# Patient Record
Sex: MALE | Race: WHITE | Employment: FULL TIME | URBAN - METROPOLITAN AREA
[De-identification: names, ages, dates, MRNs, and addresses within clinical notes are randomized per-mention and may not be internally consistent; named-entity substitution may affect disease eponyms.]

---

## 2018-09-15 ENCOUNTER — HOSPITAL ENCOUNTER (EMERGENCY)
Age: 33
Discharge: HOME | End: 2018-09-15
Attending: EMERGENCY MEDICINE
Payer: COMMERCIAL

## 2018-09-15 ENCOUNTER — APPOINTMENT (OUTPATIENT)
Dept: GENERAL RADIOLOGY | Age: 33
End: 2018-09-15
Payer: COMMERCIAL

## 2018-09-15 VITALS
HEART RATE: 106 BPM | SYSTOLIC BLOOD PRESSURE: 157 MMHG | DIASTOLIC BLOOD PRESSURE: 102 MMHG | RESPIRATION RATE: 16 BRPM | OXYGEN SATURATION: 100 % | TEMPERATURE: 98.6 F | HEIGHT: 65 IN

## 2018-09-15 DIAGNOSIS — S83.91XA SPRAIN OF RIGHT KNEE, UNSPECIFIED LIGAMENT, INITIAL ENCOUNTER: Primary | ICD-10-CM

## 2018-09-15 PROCEDURE — 73562 X-RAY EXAM OF KNEE 3: CPT

## 2018-09-15 PROCEDURE — 6370000000 HC RX 637 (ALT 250 FOR IP): Performed by: EMERGENCY MEDICINE

## 2018-09-15 PROCEDURE — 99283 EMERGENCY DEPT VISIT LOW MDM: CPT

## 2018-09-15 RX ORDER — NAPROXEN 500 MG/1
500 TABLET ORAL 2 TIMES DAILY WITH MEALS
Qty: 30 TABLET | Refills: 0 | Status: SHIPPED | OUTPATIENT
Start: 2018-09-15

## 2018-09-15 RX ORDER — HYDROCODONE BITARTRATE AND ACETAMINOPHEN 5; 325 MG/1; MG/1
1 TABLET ORAL ONCE
Status: COMPLETED | OUTPATIENT
Start: 2018-09-15 | End: 2018-09-15

## 2018-09-15 RX ADMIN — HYDROCODONE BITARTRATE AND ACETAMINOPHEN 1 TABLET: 5; 325 TABLET ORAL at 19:21

## 2018-09-15 ASSESSMENT — PAIN DESCRIPTION - PAIN TYPE: TYPE: ACUTE PAIN

## 2018-09-15 ASSESSMENT — PAIN DESCRIPTION - LOCATION: LOCATION: KNEE

## 2018-09-15 ASSESSMENT — PAIN SCALES - GENERAL
PAINLEVEL_OUTOF10: 9
PAINLEVEL_OUTOF10: 3

## 2018-09-15 ASSESSMENT — PAIN DESCRIPTION - DESCRIPTORS: DESCRIPTORS: ACHING

## 2018-09-15 ASSESSMENT — PAIN DESCRIPTION - ORIENTATION: ORIENTATION: RIGHT

## 2018-09-15 NOTE — ED TRIAGE NOTES
Patient c/o R knee pain after jolted walking up a step at 0100 l early this AM, states that he heard and felt a pop. No obvious swelling. NVS intact distally.

## 2018-09-15 NOTE — ED PROVIDER NOTES
[facility] Emergency Department    CHIEF COMPLAINT  Knee Injury      HISTORY OF PRESENT ILLNESS  Vu Romano is a 35 y.o. male  presenting to the ER for evaluation of right knee pain. Patient says pain started around 1 AM.  He accidentally twisted his right knee and has had persistent pain since that time. He describes it as a sharp pain in the right knee which is worse with movement. He has been using his own crutches at home. He denies other injuries or complaints. No other complaints, modifying factors or associated symptoms. I have reviewed the following from the nursing documentation. Past Medical History:   Diagnosis Date    Hypertension      No past surgical history on file. No family history on file. Social History     Social History    Marital status: Single     Spouse name: N/A    Number of children: N/A    Years of education: N/A     Occupational History    Not on file. Social History Main Topics    Smoking status: Current Every Day Smoker     Packs/day: 0.50    Smokeless tobacco: Never Used    Alcohol use Yes      Comment: daily    Drug use: No    Sexual activity: Not on file     Other Topics Concern    Not on file     Social History Narrative    No narrative on file     No current facility-administered medications for this encounter. Current Outpatient Prescriptions   Medication Sig Dispense Refill    LISINOPRIL PO Take by mouth      naproxen (NAPROSYN) 500 MG tablet Take 1 tablet by mouth 2 times daily (with meals) 30 tablet 0     No Known Allergies    REVIEW OF SYSTEMS  10 systems reviewed, pertinent positives per HPI otherwise noted to be negative. PHYSICAL EXAM  BP (!) 157/102 Comment: Pt States He Forgot to Take BP Med Today  Pulse 106   Temp 98.6 °F (37 °C) (Oral)   Resp 16   Ht 5' 5\" (1.651 m)   SpO2 100%   GENERAL APPEARANCE: Awake and alert. Cooperative. No acute distress. HEAD: Normocephalic. Atraumatic. EYES: PERRL. EOM's grossly intact. HEART: RRR. LUNGS: Respirations unlabored. CTAB. Good air exchange. Speaking comfortably in full sentences. EXTREMITIES: No peripheral edema. All extremities neurovascularly intact. Tenderness to palpation anterior aspect of the right knee. No significant laxity or deformity. Strong dorsalis pedis pulse bilaterally. SKIN: Warm and dry. No acute rashes. NEUROLOGICAL: Alert and oriented. CN's 2-12 intact. Strength 5/5, sensation intact. 2 plus DTR's in lower extremity bilaterally. Antalgic gait   PSYCHIATRIC: Normal mood and affect. RADIOLOGY    Xr Knee Right (3 Views)    Result Date: 9/15/2018  RIGHT KNEE HISTORY: Pain FINDINGS: There is no joint effusion. The joint spaces are intact. There is no bony destructive process or fracture. Normal right knee. ED COURSE/MDM  Patient seen and evaluated. Imaging reviewed and results discussed with patient. This is a 51-year-old male presenting with right knee pain. No radiographic evidence of acute traumatic abnormality identified on x-ray. I did discuss with the patient's possibility of internal derangement not identified on x-ray and that he would likely benefit from outpatient MRI. He was referred to orthopedics for ongoing management. Patient brought his own crutches and they were fitted to his size. Right knee immobilizer was applied and patient was discharged. CLINICAL IMPRESSION  1. Sprain of right knee, unspecified ligament, initial encounter        Blood pressure (!) 157/102, pulse 106, temperature 98.6 °F (37 °C), temperature source Oral, resp. rate 16, height 5' 5\" (1.651 m), SpO2 100 %. DISPOSITION  Catherine Manzano was discharged to home in stable condition.            Madonna Douglas DO  09/15/18 2051

## 2021-09-12 ENCOUNTER — HOSPITAL ENCOUNTER (EMERGENCY)
Age: 36
Discharge: HOME | End: 2021-09-12
Attending: EMERGENCY MEDICINE
Payer: COMMERCIAL

## 2021-09-12 VITALS
WEIGHT: 170.4 LBS | DIASTOLIC BLOOD PRESSURE: 96 MMHG | HEIGHT: 65 IN | TEMPERATURE: 99.1 F | HEART RATE: 99 BPM | RESPIRATION RATE: 14 BRPM | SYSTOLIC BLOOD PRESSURE: 144 MMHG | BODY MASS INDEX: 28.39 KG/M2 | OXYGEN SATURATION: 100 %

## 2021-09-12 DIAGNOSIS — S81.812A LACERATION OF LEFT LOWER EXTREMITY, INITIAL ENCOUNTER: Primary | ICD-10-CM

## 2021-09-12 PROCEDURE — 99283 EMERGENCY DEPT VISIT LOW MDM: CPT

## 2021-09-12 PROCEDURE — 12002 RPR S/N/AX/GEN/TRNK2.6-7.5CM: CPT

## 2021-09-13 NOTE — ED PROVIDER NOTES
[address] [address] ENCOUNTER      Pt Name: Corina Madison  MRN: [id number]  [place] 1985  Date of evaluation: 9/12/2021  Provider: Sarah Burger MD    CHIEF COMPLAINT       Chief Complaint   Patient presents with    Laceration     left lower leg. unknow sharp object in garbage bag         HISTORY OF PRESENT ILLNESS   (Location/Symptom, Timing/Onset,Context/Setting, Quality, Duration, Modifying Factors, Severity)  Note limiting factors. Corina Madison is a 39 y.o. male who presents to the emergency department for left leg wound. He was taking out the trash when an object in the garbage bag cut his leg. He denies any other injury. Nursing notes were reviewed. REVIEW OF SYSTEMS    (2-9 systems for level 4, 10 or more for level 5)     Review of Systems      PAST MEDICAL HISTORY     Past Medical History:   Diagnosis Date    Hypertension          SURGICALHISTORY     History reviewed. No pertinent surgical history. CURRENT MEDICATIONS       Discharge Medication List as of 9/12/2021 11:17 PM      CONTINUE these medications which have NOT CHANGED    Details   LISINOPRIL PO Take by mouthHistorical Med      naproxen (NAPROSYN) 500 MG tablet Take 1 tablet by mouth 2 times daily (with meals), Disp-30 tablet, R-0Print             ALLERGIES     Patient has no known allergies. FAMILY HISTORY     History reviewed. No pertinent family history.        SOCIAL HISTORY       Social History     Socioeconomic History    Marital status: Single     Spouse name: None    Number of children: None    Years of education: None    Highest education level: None   Occupational History    None   Tobacco Use    Smoking status: Current Every Day Smoker     Packs/day: 0.50     Years: 12.00     Pack years: 6.00    Smokeless tobacco: Never Used   Substance and Sexual Activity    Alcohol use: Yes     Comment: daily    Drug use: Yes     Frequency: 2.0 times per week     Types: Marijuana  Sexual activity: None   Other Topics Concern    None   Social History Narrative    None     Social Determinants of Health     Financial Resource Strain:     Difficulty of Paying Living Expenses:    Food Insecurity:     Worried About Running Out of Food in the Last Year:     [address] in the Last Year:    Transportation Needs:     Lack of Transportation (Medical):  Lack of Transportation (Non-Medical):    Physical Activity:     Days of Exercise per Week:     Minutes of Exercise per Session:    Stress:     Feeling of Stress :    Social Connections:     Frequency of Communication with Friends and Family:     Frequency of Social Gatherings with Friends and Family:     Attends [employer] Services:     Active Member of Clubs or Organizations:     Attends Club or Organization Meetings:     Marital Status:    Intimate Partner Violence:     Fear of Current or Ex-Partner:     Emotionally Abused:     Physically Abused:     Sexually Abused:        SCREENINGS             PHYSICAL EXAM    (up to 7 for level 4, 8 or more for level 5)     ED Triage Vitals [09/12/21 2132]   BP Temp Temp Source Pulse Resp SpO2 Height Weight   (!) 144/96 99.1 °F (37.3 °C) Oral 99 14 100 % 5' 5\" (1.651 m) 170 lb 6.4 oz (77.3 kg)       Physical Exam  Vitals and nursing note reviewed. Constitutional:       Appearance: Normal appearance. He is well-developed. He is not ill-appearing. HENT:      Head: Normocephalic and atraumatic. Right Ear: External ear normal.      Left Ear: External ear normal.      Nose: Nose normal.   Eyes:      General: No scleral icterus. Right eye: No discharge. Left eye: No discharge. Conjunctiva/sclera: Conjunctivae normal.   Cardiovascular:      Rate and Rhythm: Normal rate. Pulmonary:      Effort: Pulmonary effort is normal. No respiratory distress. Musculoskeletal:      Cervical back: Neck supple. Skin:     Coloration: Skin is not pale.       Comments: Gaping wound to the left leg middle 3rd anterolaterally. No active bleeding. Neurological:      Mental Status: He is alert. Psychiatric:         Mood and Affect: Mood normal.         Behavior: Behavior normal.             DIAGNOSTIC RESULTS     EKG: All EKG's are interpreted by the Emergency Department Physician who either signs or Co-signs this chart in the absence of a cardiologist.    12 lead EKG shows     RADIOLOGY:   Non-plain film images such as CT, Ultrasound and MRI are read by the radiologist. Plain radiographic images are visualized and preliminarily interpreted by the emergency physician with the below findings:        Interpretation per the Radiologist below, if available at the time of this note:    No orders to display         ED BEDSIDE ULTRASOUND:   Performed by ED Physician - none    LABS:  Labs Reviewed - No data to display    All other labs were within normal range or not returned as of this dictation. EMERGENCY DEPARTMENT COURSE and DIFFERENTIAL DIAGNOSIS/MDM:   Vitals:    Vitals:    09/12/21 2132   BP: (!) 144/96   Pulse: 99   Resp: 14   Temp: 99.1 °F (37.3 °C)   TempSrc: Oral   SpO2: 100%   Weight: 170 lb 6.4 oz (77.3 kg)   Height: 5' 5\" (1.651 m)       Laceration Repair Procedure Note    Indication: Laceration    Procedure: The patient was placed in the appropriate position and anesthesia around the laceration was obtained by infiltration using 1% Lidocaine with epinephrine. The area was then cleansed using chlorhexidine and irrigated with normal saline. The laceration was closed with 3-0 Ethilon using interrupted sutures. There were no additional lacerations requiring repair. The wound area was then dressed with a sterile dressing. Total repaired wound length: 5.5 cm. Other Items: None    The patient tolerated the procedure well. Complications: None      Tetanus is UTD as verified in chart. Counseling provided for suture removal and signs of infection.               CRITICAL CARE TIME   None       CONSULTS:  None    PROCEDURES:       Procedures    FINAL IMPRESSION      1.  Laceration of left lower extremity, initial encounter          DISPOSITION/PLAN   DISPOSITION Decision To Discharge 09/12/2021 11:16:44 PM      PATIENT REFERREDTO:  Alcira Evans MD  [place] [id number] [address] [place] [address]  [phone]      For suture removal    [address] Emergency Department  [address] [place] [address] [address] Brenton Coudriers  [phone]    For suture removal, sooner if signs of infection inclusing, redness, warmth, drainage, fevers      DISCHARGEMEDICATIONS:  Discharge Medication List as of 9/12/2021 11:17 PM             (Please note that portions of this note were completed with a voice recognition program.  Efforts were made to edit the dictations but occasionally words are mis-transcribed.)    Angi Medina MD (electronically signed)  Attending Emergency Physician        Angi Medina MD  09/13/21 5921

## 2021-09-13 NOTE — ED NOTES
Cleaned leg laceration with Hibiclens and saline. Pt. tolerated well.       Jacky Muñoz, EDEN  09/12/21 6964

## 2021-09-13 NOTE — ED TRIAGE NOTES
Pt presents to ED for laceration to LLE from \"something sharp in the garbage bag. \"  Bleeding controlled at this time.

## 2021-10-01 ENCOUNTER — HOSPITAL ENCOUNTER (EMERGENCY)
Age: 36
Discharge: HOME | End: 2021-10-01
Attending: EMERGENCY MEDICINE
Payer: COMMERCIAL

## 2021-10-01 VITALS
OXYGEN SATURATION: 89 % | BODY MASS INDEX: 27.66 KG/M2 | TEMPERATURE: 98.3 F | HEART RATE: 91 BPM | RESPIRATION RATE: 16 BRPM | WEIGHT: 172.13 LBS | SYSTOLIC BLOOD PRESSURE: 152 MMHG | HEIGHT: 66 IN | DIASTOLIC BLOOD PRESSURE: 99 MMHG

## 2021-10-01 DIAGNOSIS — Z48.02 VISIT FOR SUTURE REMOVAL: Primary | ICD-10-CM

## 2021-10-01 PROCEDURE — 99284 EMERGENCY DEPT VISIT MOD MDM: CPT

## 2021-10-01 NOTE — ED PROVIDER NOTES
CHIEF COMPLAINT  Suture / Staple Removal (left lower leg)      HISTORY OF PRESENT ILLNESS  Ritesh Gaona  is a 39 y.o. male who presents to the ED for suture removal.  He was seen here roughly a week ago after he sustained a laceration to his left lower leg. He had 9 sutures in place. He has no complaints today and would just like his sutures removed. There are no other complaints, modifying factors or associated symptoms. Nursing notes reviewed. Past medical history:  has a past medical history of Hypertension. Past surgical history:  has no past surgical history on file. Home medications:   Prior to Admission medications    Medication Sig Start Date End Date Taking? Authorizing Provider   LISINOPRIL PO Take by mouth   Yes Historical Provider, MD   naproxen (NAPROSYN) 500 MG tablet Take 1 tablet by mouth 2 times daily (with meals) 9/15/18   Taryn Hurley, DO       No Known Allergies    Social history:  reports that he has been smoking. He has a 6.00 pack-year smoking history. He has never used smokeless tobacco. He reports current alcohol use. He reports current drug use. Frequency: 2.00 times per week. Drug: Marijuana. Family history:  History reviewed. No pertinent family history. REVIEW OF SYSTEMS  6 systems reviewed, pertinent positives per HPI otherwise noted to be negative    PHYSICAL EXAM  Vitals:    10/01/21 1323   BP: (!) 152/99   Pulse: 91   Resp: 16   Temp: 98.3 °F (36.8 °C)   SpO2: (!) 89%     GENERAL APPEARANCE: Awake and alert. Cooperative. No acute distress. HEENT:  Normocephalic, atraumatic. PERRL. Conjunctiva appear normal.  External ears are normal.  MMM  NECK: Supple with normal ROM. Trachea midline  HEART: Regular rate. LUNGS:  Normal work of breathing. Speaking comfortably in full sentences. ABDOMEN: Non-distended. EXTREMITIES: 2+ distal pulses w/o edema. MUSCULOSKELETAL:  Atraumatic extremities with normal ROM grossly.   No obvious bony deformities. SKIN: Warm/dry. No rashes/lesions noted. Laceration well-healed to the left lower extremity. Sutures were removed prior to my evaluation. No surrounding erythema or warmth. PSYCHIATRIC: Patient is alert and oriented with normal affect  NEUROLOGIC: Cranial nerves grossly intact. Moves all extremities with equal strength. No gross sensory deficits. Answers questions/follows commands appropriately. ED COURSE/MDM  Nursing notes reviewed. Here the patient is afebrile with normal vitals. Please note the initial oxygen saturations were entered is 89% but this is an error. He was actually 98% on room air. Patient is well appearing. Sutures were removed here and Steri-Strips applied. Wound care instructions given. No sign of infection. Clinical Impression  Based on the presenting complaint, history, and physical exam, multiple diagnoses were considered. Exam and workup here most c/w:  1. Visit for suture removal        I discussed with the patient, the results of evaluation in the ED, diagnosis, care, and prognosis. The plan is to discharge to home. Patient is in agreement with plan and questions have been answered. I also discussed the reasons which may require a return visit and the importance of follow-up care with the patient. The patient is well-appearing, nontoxic, and improved at the time of discharge. Patient agrees to call to arrange follow-up care as directed. The patient understands to return immediately for worsening/change in symptoms. Patient will be started on the following medications from the ED:  Discharge Medication List as of 10/1/2021  1:51 PM            Disposition  Pt is discharged in stable condition.     Disposition Vitals:  BP (!) 152/99   Pulse 91   Temp 98.3 °F (36.8 °C) (Oral)   Resp 16   Ht 5' 6\" (1.676 m)   Wt 172 lb 2 oz (78.1 kg)   SpO2 (!) 89%   BMI 27.78 kg/m²        Lindsey Miranda MD  10/01/21 1800

## 2021-10-01 NOTE — ED TRIAGE NOTES
Patient to ed for suture removal wound well healed 9 sutures removed patient tolerated well, steri-strips placed.

## 2021-10-01 NOTE — ED NOTES
Patient given  discharge instructions verbal and written, patient verbalized understanding. Alert/oriented X4, Clear speech.   Patient exhibits no distress, ambulates with steady gait per self leaving unit, no further request.     Bud Simon RN  10/01/21 1400